# Patient Record
Sex: MALE | Race: WHITE | NOT HISPANIC OR LATINO | URBAN - METROPOLITAN AREA
[De-identification: names, ages, dates, MRNs, and addresses within clinical notes are randomized per-mention and may not be internally consistent; named-entity substitution may affect disease eponyms.]

---

## 2022-07-16 ENCOUNTER — OFFICE VISIT (OUTPATIENT)
Dept: URGENT CARE | Facility: CLINIC | Age: 62
End: 2022-07-16
Payer: COMMERCIAL

## 2022-07-16 VITALS
SYSTOLIC BLOOD PRESSURE: 110 MMHG | HEART RATE: 68 BPM | WEIGHT: 159.8 LBS | DIASTOLIC BLOOD PRESSURE: 68 MMHG | BODY MASS INDEX: 24.22 KG/M2 | HEIGHT: 68 IN | TEMPERATURE: 97.8 F | RESPIRATION RATE: 16 BRPM | OXYGEN SATURATION: 96 %

## 2022-07-16 DIAGNOSIS — S61.211A LACERATION OF LEFT INDEX FINGER WITHOUT FOREIGN BODY WITHOUT DAMAGE TO NAIL, INITIAL ENCOUNTER: ICD-10-CM

## 2022-07-16 DIAGNOSIS — L03.012 CELLULITIS OF LEFT INDEX FINGER: ICD-10-CM

## 2022-07-16 PROCEDURE — 99203 OFFICE O/P NEW LOW 30 MIN: CPT | Performed by: FAMILY MEDICINE

## 2022-07-16 RX ORDER — CANDESARTAN 16 MG/1
16 TABLET ORAL DAILY
COMMUNITY

## 2022-07-16 RX ORDER — SILDENAFIL 25 MG/1
25 TABLET, FILM COATED ORAL
COMMUNITY

## 2022-07-16 RX ORDER — ROSUVASTATIN CALCIUM 5 MG/1
5 TABLET, COATED ORAL DAILY
COMMUNITY
Start: 2022-02-14

## 2022-07-16 RX ORDER — CEPHALEXIN 500 MG/1
500 CAPSULE ORAL 4 TIMES DAILY
Qty: 20 CAPSULE | Refills: 0 | Status: SHIPPED | OUTPATIENT
Start: 2022-07-16 | End: 2022-07-21

## 2022-07-16 ASSESSMENT — ENCOUNTER SYMPTOMS
FOCAL WEAKNESS: 0
NAUSEA: 0
SENSORY CHANGE: 0
SORE THROAT: 0
CHILLS: 0
SHORTNESS OF BREATH: 0
FEVER: 0
MYALGIAS: 0
TINGLING: 0
VOMITING: 0
COUGH: 0

## 2022-07-16 NOTE — PROGRESS NOTES
"Subjective:   Pb Gold is a 61 y.o. male who presents for Laceration (Pt has a laceration on (L) index finger, swelling  x 6 days )        Laceration   Incident onset: 6 days. Pain location: Left index finger. Size: 1.5 cm. Injury mechanism: Knife. The pain is mild. The pain has been constant since onset. He reports no foreign bodies present. His tetanus status is UTD.     PMH:  has no past medical history on file.  MEDS:   Current Outpatient Medications:   •  rosuvastatin (CRESTOR) 5 MG Tab, Take 5 mg by mouth every day., Disp: , Rfl:   •  sildenafil citrate (VIAGRA) 25 MG Tab, Take 25 mg by mouth., Disp: , Rfl:   •  candesartan (ATACAND) 16 MG Tab, Take 16 mg by mouth every day., Disp: , Rfl:   •  cephALEXin (KEFLEX) 500 MG Cap, Take 1 Capsule by mouth 4 times a day for 5 days., Disp: 20 Capsule, Rfl: 0  ALLERGIES:   Allergies   Allergen Reactions   • Atenolol Unspecified     fatigue  fatigue     • Metolazone Unspecified     Rash  Rash     • Olmesartan Cough     cough  cough     • Thiazide-Type Diuretics      Rash   • Losartan Rash     SURGHX: History reviewed. No pertinent surgical history.  SOCHX:  reports that he has never smoked. He has never used smokeless tobacco. He reports current alcohol use. He reports previous drug use.  FH: History reviewed. No pertinent family history.  Review of Systems   Constitutional: Negative for chills and fever.   HENT: Negative for sore throat.    Respiratory: Negative for cough and shortness of breath.    Gastrointestinal: Negative for nausea and vomiting.   Musculoskeletal: Negative for myalgias.   Skin: Negative for rash.   Neurological: Negative for tingling, sensory change and focal weakness.        Objective:   /68 (BP Location: Left arm, Patient Position: Sitting, BP Cuff Size: Adult)   Pulse 68   Temp 36.6 °C (97.8 °F) (Temporal)   Resp 16   Ht 1.727 m (5' 8\")   Wt 72.5 kg (159 lb 12.8 oz)   SpO2 96%   BMI 24.30 kg/m²   Physical Exam  Vitals " and nursing note reviewed.   Constitutional:       General: He is not in acute distress.     Appearance: He is well-developed.   HENT:      Head: Normocephalic and atraumatic.      Right Ear: External ear normal.      Left Ear: External ear normal.      Nose: Nose normal.      Mouth/Throat:      Mouth: Mucous membranes are moist.   Eyes:      Conjunctiva/sclera: Conjunctivae normal.   Cardiovascular:      Rate and Rhythm: Normal rate.   Pulmonary:      Effort: Pulmonary effort is normal. No respiratory distress.      Breath sounds: Normal breath sounds.   Abdominal:      General: There is no distension.   Musculoskeletal:         General: Normal range of motion.        Hands:    Skin:     General: Skin is warm and dry.   Neurological:      General: No focal deficit present.      Mental Status: He is alert and oriented to person, place, and time. Mental status is at baseline.      Gait: Gait (gait at baseline) normal.   Psychiatric:         Judgment: Judgment normal.           Assessment/Plan:   1. Laceration of left index finger without foreign body without damage to nail, initial encounter    2. Cellulitis of left index finger  - cephALEXin (KEFLEX) 500 MG Cap; Take 1 Capsule by mouth 4 times a day for 5 days.  Dispense: 20 Capsule; Refill: 0    Other orders  - rosuvastatin (CRESTOR) 5 MG Tab; Take 5 mg by mouth every day.  - sildenafil citrate (VIAGRA) 25 MG Tab; Take 25 mg by mouth.  - candesartan (ATACAND) 16 MG Tab; Take 16 mg by mouth every day.        Medical Decision Making/Course:  In the course of preparing for this visit with review of the pertinent past medical history, recent and past clinic visits, current medications, and performing chart, immunization, medical history and medication reconciliation, and in the further course of obtaining the current history pertinent to the clinic visit today, performing an exam and evaluation, ordering and independently evaluating labs, tests  , and/or procedures,  prescribing any recommended new medications as noted above, providing any pertinent counseling and education and recommending further coordination of care including recommendations for symptomatic and supportive measures, at least  12 minutes of total time were spent during this encounter.      Discussed close monitoring, return precautions, and supportive measures of maintaining adequate fluid hydration and caloric intake, relative rest and symptom management as needed for pain and/or fever.    Differential diagnosis, natural history, supportive care, and indications for immediate follow-up discussed.     Advised the patient to follow-up with the primary care physician for recheck, reevaluation, and consideration of further management.    Please note that this dictation was created using voice recognition software. I have worked with consultants from the vendor as well as technical experts from [facility] [employer] to optimize the interface. I have made every reasonable attempt to correct obvious errors, but I expect that there are errors of grammar and possibly content that I did not discover before finalizing the note.

## 2022-07-16 NOTE — PATIENT INSTRUCTIONS
Cellulitis, Adult    Cellulitis is a skin infection. The infected area is often warm, red, swollen, and sore. It occurs most often in the arms and lower legs. It is very important to get treated for this condition.  What are the causes?  This condition is caused by bacteria. The bacteria enter through a break in the skin, such as a cut, burn, insect bite, open sore, or crack.  What increases the risk?  This condition is more likely to occur in people who:  Have a weak body defense system (immune system).  Have open cuts, burns, bites, or scrapes on the skin.  Are older than 60 years of age.  Have a blood sugar problem (diabetes).  Have a long-lasting (chronic) liver disease (cirrhosis) or kidney disease.  Are very overweight (obese).  Have a skin problem, such as:  Itchy rash (eczema).  Slow movement of blood in the veins (venous stasis).  Fluid buildup below the skin (edema).  Have been treated with high-energy rays (radiation).  Use IV drugs.  What are the signs or symptoms?  Symptoms of this condition include:  Skin that is:  Red.  Streaking.  Spotting.  Swollen.  Sore or painful when you touch it.  Warm.  A fever.  Chills.  Blisters.  How is this diagnosed?  This condition is diagnosed based on:  Medical history.  Physical exam.  Blood tests.  Imaging tests.  How is this treated?  Treatment for this condition may include:  Medicines to treat infections or allergies.  Home care, such as:  Rest.  Placing cold or warm cloths (compresses) on the skin.  Hospital care, if the condition is very bad.  Follow these instructions at home:  Medicines  Take over-the-counter and prescription medicines only as told by your doctor.  If you were prescribed an antibiotic medicine, take it as told by your doctor. Do not stop taking it even if you start to feel better.  General instructions    Drink enough fluid to keep your pee (urine) pale yellow.  Do not touch or rub the infected area.  Raise (elevate) the infected area above  the level of your heart while you are sitting or lying down.  Place cold or warm cloths on the area as told by your doctor.  Keep all follow-up visits as told by your doctor. This is important.  Contact a doctor if:  You have a fever.  You do not start to get better after 1-2 days of treatment.  Your bone or joint under the infected area starts to hurt after the skin has healed.  Your infection comes back. This can happen in the same area or another area.  You have a swollen bump in the area.  You have new symptoms.  You feel ill and have muscle aches and pains.  Get help right away if:  Your symptoms get worse.  You feel very sleepy.  You throw up (vomit) or have watery poop (diarrhea) for a long time.  You see red streaks coming from the area.  Your red area gets larger.  Your red area turns dark in color.  These symptoms may represent a serious problem that is an emergency. Do not wait to see if the symptoms will go away. Get medical help right away. Call your local emergency services (911 in the [place] Do not drive yourself to the hospital.  Summary  Cellulitis is a skin infection. The area is often warm, red, swollen, and sore.  This condition is treated with medicines, rest, and cold and warm cloths.  Take all medicines only as told by your doctor.  Tell your doctor if symptoms do not start to get better after 1-2 days of treatment.  This information is not intended to replace advice given to you by your health care provider. Make sure you discuss any questions you have with your health care provider.  Document Released: 06/05/2009 Document Revised: 05/09/2019 Document Reviewed: 05/09/2019  [employer] Patient Education © 2020 [employer]